# Patient Record
Sex: MALE | Race: WHITE | NOT HISPANIC OR LATINO | Employment: OTHER | URBAN - NONMETROPOLITAN AREA
[De-identification: names, ages, dates, MRNs, and addresses within clinical notes are randomized per-mention and may not be internally consistent; named-entity substitution may affect disease eponyms.]

---

## 2023-11-22 DIAGNOSIS — I10 ESSENTIAL HYPERTENSION: ICD-10-CM

## 2023-11-22 PROBLEM — R06.89 DIFFICULTY BREATHING: Status: ACTIVE | Noted: 2023-11-22

## 2023-11-22 PROBLEM — R94.31 ABNORMAL ELECTROCARDIOGRAM: Status: ACTIVE | Noted: 2023-11-22

## 2023-11-22 PROBLEM — I25.10 SINGLE VESSEL CORONARY DISEASE: Status: ACTIVE | Noted: 2023-11-22

## 2023-11-22 RX ORDER — SERTRALINE HYDROCHLORIDE 100 MG/1
1 TABLET, FILM COATED ORAL DAILY
COMMUNITY
Start: 2021-12-09

## 2023-11-22 RX ORDER — LISINOPRIL 10 MG/1
1 TABLET ORAL DAILY
COMMUNITY
Start: 2021-12-09 | End: 2023-11-22 | Stop reason: SDUPTHER

## 2023-11-22 RX ORDER — NAPROXEN 500 MG/1
1 TABLET ORAL 2 TIMES DAILY PRN
COMMUNITY
Start: 2022-02-10 | End: 2024-01-31 | Stop reason: ALTCHOICE

## 2023-11-22 RX ORDER — DICLOFENAC SODIUM 75 MG/1
1 TABLET, DELAYED RELEASE ORAL 2 TIMES DAILY PRN
COMMUNITY
Start: 2021-02-08

## 2023-11-22 RX ORDER — BUSPIRONE HYDROCHLORIDE 5 MG/1
1 TABLET ORAL 2 TIMES DAILY
COMMUNITY

## 2023-11-22 RX ORDER — METOPROLOL TARTRATE 100 MG/1
1 TABLET ORAL 2 TIMES DAILY
COMMUNITY
Start: 2020-12-03 | End: 2023-11-22 | Stop reason: SDUPTHER

## 2023-11-22 RX ORDER — NITROGLYCERIN 0.3 MG/1
0.3 TABLET SUBLINGUAL EVERY 5 MIN PRN
COMMUNITY
Start: 2021-06-29 | End: 2024-01-31 | Stop reason: DRUGHIGH

## 2023-11-22 RX ORDER — ATORVASTATIN CALCIUM 10 MG/1
1 TABLET, FILM COATED ORAL NIGHTLY
COMMUNITY
Start: 2020-12-03 | End: 2024-01-31 | Stop reason: SDUPTHER

## 2023-11-22 RX ORDER — LISINOPRIL 10 MG/1
10 TABLET ORAL DAILY
Qty: 30 TABLET | Refills: 3 | Status: SHIPPED | OUTPATIENT
Start: 2023-11-22 | End: 2024-01-31 | Stop reason: DRUGHIGH

## 2023-11-22 RX ORDER — AMLODIPINE BESYLATE 10 MG/1
10 TABLET ORAL DAILY
Qty: 90 TABLET | Refills: 3 | Status: SHIPPED | OUTPATIENT
Start: 2023-11-22 | End: 2024-01-31 | Stop reason: SDUPTHER

## 2023-11-22 RX ORDER — MOMETASONE FUROATE AND FORMOTEROL FUMARATE DIHYDRATE 100; 5 UG/1; UG/1
2 AEROSOL RESPIRATORY (INHALATION)
COMMUNITY
Start: 2021-12-02

## 2023-11-22 RX ORDER — METOPROLOL TARTRATE 100 MG/1
100 TABLET ORAL 2 TIMES DAILY
Qty: 180 TABLET | Refills: 3 | Status: SHIPPED | OUTPATIENT
Start: 2023-11-22 | End: 2024-01-31 | Stop reason: SDUPTHER

## 2023-11-22 RX ORDER — ALBUTEROL SULFATE 90 UG/1
2 AEROSOL, METERED RESPIRATORY (INHALATION) EVERY 4 HOURS PRN
COMMUNITY
Start: 2022-01-08

## 2023-11-22 RX ORDER — AMLODIPINE BESYLATE 10 MG/1
1 TABLET ORAL DAILY
COMMUNITY
Start: 2022-02-22 | End: 2023-11-22 | Stop reason: SDUPTHER

## 2024-01-30 ENCOUNTER — APPOINTMENT (OUTPATIENT)
Dept: CARDIOLOGY | Facility: CLINIC | Age: 64
End: 2024-01-30
Payer: MEDICARE

## 2024-01-31 ENCOUNTER — OFFICE VISIT (OUTPATIENT)
Dept: CARDIOLOGY | Facility: CLINIC | Age: 64
End: 2024-01-31
Payer: MEDICARE

## 2024-01-31 VITALS
SYSTOLIC BLOOD PRESSURE: 168 MMHG | DIASTOLIC BLOOD PRESSURE: 98 MMHG | HEIGHT: 70 IN | WEIGHT: 194 LBS | HEART RATE: 82 BPM | BODY MASS INDEX: 27.77 KG/M2

## 2024-01-31 DIAGNOSIS — E78.5 HYPERLIPIDEMIA, UNSPECIFIED HYPERLIPIDEMIA TYPE: ICD-10-CM

## 2024-01-31 DIAGNOSIS — R06.89 DIFFICULTY BREATHING: Primary | ICD-10-CM

## 2024-01-31 DIAGNOSIS — I25.10 SINGLE VESSEL CORONARY DISEASE: ICD-10-CM

## 2024-01-31 DIAGNOSIS — I10 ESSENTIAL HYPERTENSION: ICD-10-CM

## 2024-01-31 DIAGNOSIS — R94.31 ABNORMAL ELECTROCARDIOGRAM: ICD-10-CM

## 2024-01-31 PROCEDURE — 3080F DIAST BP >= 90 MM HG: CPT | Performed by: INTERNAL MEDICINE

## 2024-01-31 PROCEDURE — 1159F MED LIST DOCD IN RCRD: CPT | Performed by: INTERNAL MEDICINE

## 2024-01-31 PROCEDURE — 4004F PT TOBACCO SCREEN RCVD TLK: CPT | Performed by: INTERNAL MEDICINE

## 2024-01-31 PROCEDURE — 1160F RVW MEDS BY RX/DR IN RCRD: CPT | Performed by: INTERNAL MEDICINE

## 2024-01-31 PROCEDURE — 3077F SYST BP >= 140 MM HG: CPT | Performed by: INTERNAL MEDICINE

## 2024-01-31 PROCEDURE — 99214 OFFICE O/P EST MOD 30 MIN: CPT | Performed by: INTERNAL MEDICINE

## 2024-01-31 RX ORDER — METOPROLOL TARTRATE 100 MG/1
100 TABLET ORAL 2 TIMES DAILY
Qty: 180 TABLET | Refills: 3 | Status: SHIPPED | OUTPATIENT
Start: 2024-01-31 | End: 2024-04-12 | Stop reason: SDUPTHER

## 2024-01-31 RX ORDER — AMLODIPINE BESYLATE 10 MG/1
10 TABLET ORAL DAILY
Qty: 90 TABLET | Refills: 3 | Status: SHIPPED | OUTPATIENT
Start: 2024-01-31 | End: 2024-04-12 | Stop reason: SDUPTHER

## 2024-01-31 RX ORDER — NEBULIZER AND COMPRESSOR
EACH MISCELLANEOUS
Qty: 1 EACH | Refills: 0 | Status: SHIPPED | OUTPATIENT
Start: 2024-01-31

## 2024-01-31 RX ORDER — LISINOPRIL 20 MG/1
20 TABLET ORAL DAILY
Qty: 30 TABLET | Refills: 11 | Status: SHIPPED | OUTPATIENT
Start: 2024-01-31 | End: 2024-03-04

## 2024-01-31 RX ORDER — NITROGLYCERIN 0.4 MG/1
0.4 TABLET SUBLINGUAL EVERY 5 MIN PRN
COMMUNITY
End: 2024-01-31 | Stop reason: SDUPTHER

## 2024-01-31 RX ORDER — NITROGLYCERIN 0.4 MG/1
0.4 TABLET SUBLINGUAL EVERY 5 MIN PRN
Qty: 25 TABLET | Refills: 11 | Status: SHIPPED | OUTPATIENT
Start: 2024-01-31 | End: 2024-04-12 | Stop reason: SDUPTHER

## 2024-01-31 RX ORDER — ATORVASTATIN CALCIUM 10 MG/1
10 TABLET, FILM COATED ORAL NIGHTLY
Qty: 90 TABLET | Refills: 3 | Status: SHIPPED | OUTPATIENT
Start: 2024-01-31 | End: 2024-04-12 | Stop reason: SDUPTHER

## 2024-01-31 NOTE — PROGRESS NOTES
"Subjective   Estrada Hawthorne is a 63 y.o. male       Chief Complaint    Annual Exam          HPI   Patient is here for follow-up continue management for previous evaluation for shortness of breath.  Unfortunately his insurance has denied stress test.  Since the last time I saw him he reports he is feeling well.  He denies complaint of chest pain, palpitation, lightheadedness, dizziness or syncope.  He reported functional class I.  His blood pressure remains elevated.    Assessment     1. Shortness of breath probably due to prior tobacco use, and uncontrolled hypertension and sedentary lifestyle. Insurance declined stress test.  He remains reasonably active.  Currently functional class I  2. Hypertension not well controlled.   3.  Mildly overweight  4. Moderate coronary artery disease based on cardiac catheterization remotely affecting the ostium of nondominant right coronary artery based on cardiac catheterization 2002   5. Borderline abnormal EKG with incomplete right bundle branch block  6.  Previous history of depression but no suicidal ideation under control control according to him     Plan     1. I advised the patient to increase his lisinopril to 20 mg daily and to have blood pressure check and BMP  2.  Patient admits to noncompliance with salt restriction.  I counseled him at length regarding salt restriction and nonpharmacologic approach for hypertension  3. I emphasized the importance of compliance with his medication  4. Patient was counseled regarding adjustment of coronary risk factors, weight loss and exercise  5. Follow-up in 6 month  6. He was advised to discuss with his PCP his depression     Review of Systems   All other systems reviewed and are negative.         Visit Vitals  BP (!) 168/98 (BP Location: Left arm, Patient Position: Sitting)   Pulse 82   Ht 1.778 m (5' 10\")   Wt 88 kg (194 lb)   BMI 27.84 kg/m²   Smoking Status Some Days   BSA 2.08 m²        Objective   Physical Exam  Constitutional:  "      Appearance: Normal appearance. He is normal weight.   HENT:      Nose: Nose normal.   Neck:      Vascular: No carotid bruit.   Cardiovascular:      Rate and Rhythm: Normal rate.      Pulses: Normal pulses.      Heart sounds: Normal heart sounds.   Pulmonary:      Effort: Pulmonary effort is normal.   Abdominal:      General: Bowel sounds are normal.      Palpations: Abdomen is soft.   Genitourinary:     Rectum: Normal.   Musculoskeletal:         General: Normal range of motion.      Cervical back: Normal range of motion.      Right lower leg: No edema.      Left lower leg: No edema.   Skin:     General: Skin is warm and dry.   Neurological:      General: No focal deficit present.      Mental Status: He is alert.   Psychiatric:         Mood and Affect: Mood normal.         Behavior: Behavior normal.         Thought Content: Thought content normal.         Judgment: Judgment normal.         Current Medications    Current Outpatient Medications:     albuterol (ProAir HFA) 90 mcg/actuation inhaler, Inhale 2 puffs every 4 hours if needed., Disp: , Rfl:     busPIRone (Buspar) 5 mg tablet, Take 1 tablet (5 mg) by mouth 2 times a day., Disp: , Rfl:     diclofenac (Voltaren) 75 mg EC tablet, Take 1 tablet (75 mg) by mouth 2 times a day as needed., Disp: , Rfl:     mometasone-formoterol (Dulera) 100-5 mcg/actuation inhaler, Inhale 2 puffs 2 times a day., Disp: , Rfl:     sertraline (Zoloft) 100 mg tablet, Take 1 tablet (100 mg) by mouth once daily., Disp: , Rfl:     amLODIPine (Norvasc) 10 mg tablet, Take 1 tablet (10 mg) by mouth once daily., Disp: 90 tablet, Rfl: 3    atorvastatin (Lipitor) 10 mg tablet, Take 1 tablet (10 mg) by mouth once daily at bedtime., Disp: 90 tablet, Rfl: 3    lisinopril 20 mg tablet, Take 1 tablet (20 mg) by mouth once daily., Disp: 30 tablet, Rfl: 11    metoprolol tartrate (Lopressor) 100 mg tablet, Take 1 tablet (100 mg) by mouth 2 times a day., Disp: 180 tablet, Rfl: 3    miscellaneous  medical supply (Blood Pressure Cuff) misc, B/p cuff for htn, Disp: 1 each, Rfl: 0    nitroglycerin (Nitrostat) 0.4 mg SL tablet, Place 1 tablet (0.4 mg) under the tongue every 5 minutes if needed for chest pain., Disp: 25 tablet, Rfl: 11                     Assessment/Plan   1. Difficulty breathing  Basic Metabolic Panel      2. Single vessel coronary disease  Basic Metabolic Panel    Glucose, fasting    Follow Up In Cardiology    nitroglycerin (Nitrostat) 0.4 mg SL tablet    atorvastatin (Lipitor) 10 mg tablet      3. Abnormal electrocardiogram        4. Essential hypertension  Follow Up In Cardiology    Basic Metabolic Panel    lisinopril 20 mg tablet    Glucose, fasting    Follow Up In Cardiology    miscellaneous medical supply (Blood Pressure Cuff) misc    amLODIPine (Norvasc) 10 mg tablet    metoprolol tartrate (Lopressor) 100 mg tablet      5. Hyperlipidemia, unspecified hyperlipidemia type  atorvastatin (Lipitor) 10 mg tablet         Scribe Attestation  By signing my name below, I, [email or web address] , Scribtigist   attest that this documentation has been prepared under the direction and in the presence of Messi Sr MD.

## 2024-01-31 NOTE — PATIENT INSTRUCTIONS
Please bring all medicines, vitamins, and herbal supplements with you when you come to the office.    Prescriptions will not be filled unless you are compliant with your follow up appointments or have a follow up appointment scheduled as per instruction of your physician. Refills should be requested at the time of your visit.     Fall Prevention Education Given

## 2024-01-31 NOTE — LETTER
January 31, 2024     AMANDA Foss  [address] [place] [id number]    Patient: Estrada Hawthorne   YOB: 1960   Date of Visit: 1/31/2024       Dear AMANDA Rodriguez:    Thank you for referring Estrada Hawthorne to me for evaluation. Below are my notes for this consultation.  If you have questions, please do not hesitate to call me. I look forward to following your patient along with you.       Sincerely,     Messi rS MD      CC: No Recipients  ______________________________________________________________________________________    Subjective   Estrada Hawthorne is a 63 y.o. male       Chief Complaint    Annual Exam          HPI   Patient is here for follow-up continue management for previous evaluation for shortness of breath.  Unfortunately his insurance has denied stress test.  Since the last time I saw him he reports he is feeling well.  He denies complaint of chest pain, palpitation, lightheadedness, dizziness or syncope.  He reported functional class I.  His blood pressure remains elevated.    Assessment     1. Shortness of breath probably due to prior tobacco use, and uncontrolled hypertension and sedentary lifestyle. Insurance declined stress test.  He remains reasonably active.  Currently functional class I  2. Hypertension not well controlled.   3.  Mildly overweight  4. Moderate coronary artery disease based on cardiac catheterization remotely affecting the ostium of nondominant right coronary artery based on cardiac catheterization 2002   5. Borderline abnormal EKG with incomplete right bundle branch block  6.  Previous history of depression but no suicidal ideation under control control according to him     Plan     1. I advised the patient to increase his lisinopril to 20 mg daily and to have blood pressure check and BMP  2.  Patient admits to noncompliance with salt restriction.  I counseled him at length regarding salt restriction and nonpharmacologic  "approach for hypertension  3. I emphasized the importance of compliance with his medication  4. Patient was counseled regarding adjustment of coronary risk factors, weight loss and exercise  5. Follow-up in 6 month  6. He was advised to discuss with his PCP his depression     Review of Systems   All other systems reviewed and are negative.         Visit Vitals  BP (!) 168/98 (BP Location: Left arm, Patient Position: Sitting)   Pulse 82   Ht 1.778 m (5' 10\")   Wt 88 kg (194 lb)   BMI 27.84 kg/m²   Smoking Status Some Days   BSA 2.08 m²        Objective   Physical Exam  Constitutional:       Appearance: Normal appearance. He is normal weight.   HENT:      Nose: Nose normal.   Neck:      Vascular: No carotid bruit.   Cardiovascular:      Rate and Rhythm: Normal rate.      Pulses: Normal pulses.      Heart sounds: Normal heart sounds.   Pulmonary:      Effort: Pulmonary effort is normal.   Abdominal:      General: Bowel sounds are normal.      Palpations: Abdomen is soft.   Genitourinary:     Rectum: Normal.   Musculoskeletal:         General: Normal range of motion.      Cervical back: Normal range of motion.      Right lower leg: No edema.      Left lower leg: No edema.   Skin:     General: Skin is warm and dry.   Neurological:      General: No focal deficit present.      Mental Status: He is alert.   Psychiatric:         Mood and Affect: Mood normal.         Behavior: Behavior normal.         Thought Content: Thought content normal.         Judgment: Judgment normal.         Current Medications    Current Outpatient Medications:   •  albuterol (ProAir HFA) 90 mcg/actuation inhaler, Inhale 2 puffs every 4 hours if needed., Disp: , Rfl:   •  busPIRone (Buspar) 5 mg tablet, Take 1 tablet (5 mg) by mouth 2 times a day., Disp: , Rfl:   •  diclofenac (Voltaren) 75 mg EC tablet, Take 1 tablet (75 mg) by mouth 2 times a day as needed., Disp: , Rfl:   •  mometasone-formoterol (Dulera) 100-5 mcg/actuation inhaler, Inhale 2 " puffs 2 times a day., Disp: , Rfl:   •  sertraline (Zoloft) 100 mg tablet, Take 1 tablet (100 mg) by mouth once daily., Disp: , Rfl:   •  amLODIPine (Norvasc) 10 mg tablet, Take 1 tablet (10 mg) by mouth once daily., Disp: 90 tablet, Rfl: 3  •  atorvastatin (Lipitor) 10 mg tablet, Take 1 tablet (10 mg) by mouth once daily at bedtime., Disp: 90 tablet, Rfl: 3  •  lisinopril 20 mg tablet, Take 1 tablet (20 mg) by mouth once daily., Disp: 30 tablet, Rfl: 11  •  metoprolol tartrate (Lopressor) 100 mg tablet, Take 1 tablet (100 mg) by mouth 2 times a day., Disp: 180 tablet, Rfl: 3  •  miscellaneous medical supply (Blood Pressure Cuff) misc, B/p cuff for htn, Disp: 1 each, Rfl: 0  •  nitroglycerin (Nitrostat) 0.4 mg SL tablet, Place 1 tablet (0.4 mg) under the tongue every 5 minutes if needed for chest pain., Disp: 25 tablet, Rfl: 11                     Assessment/Plan   1. Difficulty breathing  Basic Metabolic Panel      2. Single vessel coronary disease  Basic Metabolic Panel    Glucose, fasting    Follow Up In Cardiology    nitroglycerin (Nitrostat) 0.4 mg SL tablet    atorvastatin (Lipitor) 10 mg tablet      3. Abnormal electrocardiogram        4. Essential hypertension  Follow Up In Cardiology    Basic Metabolic Panel    lisinopril 20 mg tablet    Glucose, fasting    Follow Up In Cardiology    miscellaneous medical supply (Blood Pressure Cuff) misc    amLODIPine (Norvasc) 10 mg tablet    metoprolol tartrate (Lopressor) 100 mg tablet      5. Hyperlipidemia, unspecified hyperlipidemia type  atorvastatin (Lipitor) 10 mg tablet         Scribe Attestation  By signing my name below, I, [email or web address] , Scribe   attest that this documentation has been prepared under the direction and in the presence of eMssi Sr MD.

## 2024-03-03 DIAGNOSIS — I10 ESSENTIAL HYPERTENSION: ICD-10-CM

## 2024-03-04 RX ORDER — LISINOPRIL 20 MG/1
20 TABLET ORAL DAILY
Qty: 90 TABLET | Refills: 3 | Status: SHIPPED | OUTPATIENT
Start: 2024-03-04 | End: 2024-04-12 | Stop reason: SDUPTHER

## 2024-03-13 ENCOUNTER — OFFICE VISIT (OUTPATIENT)
Dept: CARDIOLOGY | Facility: CLINIC | Age: 64
End: 2024-03-13
Payer: MEDICARE

## 2024-03-13 VITALS
SYSTOLIC BLOOD PRESSURE: 150 MMHG | WEIGHT: 195 LBS | HEART RATE: 74 BPM | DIASTOLIC BLOOD PRESSURE: 80 MMHG | BODY MASS INDEX: 27.92 KG/M2 | HEIGHT: 70 IN

## 2024-03-13 DIAGNOSIS — R06.89 DIFFICULTY BREATHING: ICD-10-CM

## 2024-03-13 DIAGNOSIS — I10 ESSENTIAL HYPERTENSION: ICD-10-CM

## 2024-03-13 DIAGNOSIS — I82.461 ACUTE DEEP VEIN THROMBOSIS (DVT) OF CALF MUSCLE VEIN OF RIGHT LOWER EXTREMITY (MULTI): Primary | ICD-10-CM

## 2024-03-13 DIAGNOSIS — F17.210 CIGARETTE SMOKER: ICD-10-CM

## 2024-03-13 PROCEDURE — 1160F RVW MEDS BY RX/DR IN RCRD: CPT | Performed by: INTERNAL MEDICINE

## 2024-03-13 PROCEDURE — 3079F DIAST BP 80-89 MM HG: CPT | Performed by: INTERNAL MEDICINE

## 2024-03-13 PROCEDURE — 99212 OFFICE O/P EST SF 10 MIN: CPT | Performed by: INTERNAL MEDICINE

## 2024-03-13 PROCEDURE — 1159F MED LIST DOCD IN RCRD: CPT | Performed by: INTERNAL MEDICINE

## 2024-03-13 PROCEDURE — 4004F PT TOBACCO SCREEN RCVD TLK: CPT | Performed by: INTERNAL MEDICINE

## 2024-03-13 PROCEDURE — 3008F BODY MASS INDEX DOCD: CPT | Performed by: INTERNAL MEDICINE

## 2024-03-13 PROCEDURE — 3077F SYST BP >= 140 MM HG: CPT | Performed by: INTERNAL MEDICINE

## 2024-03-13 RX ORDER — HYDROCHLOROTHIAZIDE 25 MG/1
25 TABLET ORAL DAILY
Qty: 90 TABLET | Refills: 3 | Status: SHIPPED | OUTPATIENT
Start: 2024-03-13 | End: 2024-04-12 | Stop reason: SDUPTHER

## 2024-03-13 ASSESSMENT — ENCOUNTER SYMPTOMS: SHORTNESS OF BREATH: 1

## 2024-03-13 NOTE — PROGRESS NOTES
"Subjective   Estrada Hawthorne is a 63 y.o. male       Chief Complaint    Blood Pressure Check          HPI   Patient is here for for follow-up and continue management for hypertension.  We increased his lisinopril recently.  His blood pressure remains elevated.  In addition he reports that he was in the emergency room recently and was diagnosed with distal lower extremity thrombophlebitis.  Apparently a prescription for Eliquis was sent but apparently the patient did not find one in the pharmacy.  He requesting an assistant with that.    Assessment    1.  Hypertension remain not well-controlled  2.  Recent diagnosis of distal thrombophlebitis of the right lower extremity    Plan    1.  Will add hydrochlorothiazide 25 mg daily and do a blood pressure check and a BMP in 6 weeks  2.  Will give him a prescription for Eliquis 5 mg twice daily to be used for 3 to 6-month and we will reevaluate down the road.  Risk, benefits alternative anticoagulation reviewed with patient at length he understood and agreed  Review of Systems   Respiratory:  Positive for shortness of breath.             Vitals:    03/13/24 1348 03/13/24 1352 03/13/24 1400   BP: 172/90 160/84 150/80   BP Location: Left arm Right arm Right arm   Patient Position: Sitting Sitting Sitting   Pulse: 74     Weight: 88.5 kg (195 lb)     Height: 1.778 m (5' 10\")          Objective   Physical Exam    Allergies  Patient has no known allergies.     Current Medications    Current Outpatient Medications:     albuterol (ProAir HFA) 90 mcg/actuation inhaler, Inhale 2 puffs every 4 hours if needed., Disp: , Rfl:     amLODIPine (Norvasc) 10 mg tablet, Take 1 tablet (10 mg) by mouth once daily., Disp: 90 tablet, Rfl: 3    atorvastatin (Lipitor) 10 mg tablet, Take 1 tablet (10 mg) by mouth once daily at bedtime., Disp: 90 tablet, Rfl: 3    busPIRone (Buspar) 5 mg tablet, Take 1 tablet (5 mg) by mouth 2 times a day., Disp: , Rfl:     diclofenac (Voltaren) 75 mg EC tablet, Take 1 " tablet (75 mg) by mouth 2 times a day as needed., Disp: , Rfl:     lisinopril 20 mg tablet, take 1 tablet by mouth once daily, Disp: 90 tablet, Rfl: 3    metoprolol tartrate (Lopressor) 100 mg tablet, Take 1 tablet (100 mg) by mouth 2 times a day., Disp: 180 tablet, Rfl: 3    mometasone-formoterol (Dulera) 100-5 mcg/actuation inhaler, Inhale 2 puffs 2 times a day., Disp: , Rfl:     nitroglycerin (Nitrostat) 0.4 mg SL tablet, Place 1 tablet (0.4 mg) under the tongue every 5 minutes if needed for chest pain., Disp: 25 tablet, Rfl: 11    sertraline (Zoloft) 100 mg tablet, Take 1 tablet (100 mg) by mouth once daily., Disp: , Rfl:     hydroCHLOROthiazide (HYDRODiuril) 25 mg tablet, Take 1 tablet (25 mg) by mouth once daily., Disp: 90 tablet, Rfl: 3    miscellaneous medical supply (Blood Pressure Cuff) misc, B/p cuff for htn, Disp: 1 each, Rfl: 0                     Assessment/Plan   1. Essential hypertension  Follow Up In Cardiology    hydroCHLOROthiazide (HYDRODiuril) 25 mg tablet    Basic Metabolic Panel    Basic Metabolic Panel      2. BMI 27.0-27.9,adult        3. Cigarette smoker                 Scribe Attestation  By signing my name below, Christine GAVIRIA LPN, Scribe   attest that this documentation has been prepared under the direction and in the presence of Messi Sr MD.     Provider Attestation - Scribe documentation    All medical record entries made by the Scribe were at my direction and personally dictated by me. I have reviewed the chart and agree that the record accurately reflects my personal performance of the history, physical exam, discussion and plan.

## 2024-03-13 NOTE — PATIENT INSTRUCTIONS
Please bring all medicines, vitamins, and herbal supplements with you when you come to the office.    Prescriptions will not be filled unless you are compliant with your follow up appointments or have a follow up appointment scheduled as per instruction of your physician. Refills should be requested at the time of your visit.    BMI was above normal measurement. Current weight: 88.5 kg (195 lb)  Weight change since last visit (-) denotes wt loss 1 lbs   Weight loss needed to achieve BMI 25: 21.1 Lbs  Weight loss needed to achieve BMI 30: -13.6 Lbs  Provided instructions on dietary changes  Provided instructions on exercise.

## 2024-04-12 DIAGNOSIS — I25.10 SINGLE VESSEL CORONARY DISEASE: ICD-10-CM

## 2024-04-12 DIAGNOSIS — E78.5 HYPERLIPIDEMIA, UNSPECIFIED HYPERLIPIDEMIA TYPE: ICD-10-CM

## 2024-04-12 DIAGNOSIS — I10 ESSENTIAL HYPERTENSION: ICD-10-CM

## 2024-04-12 RX ORDER — NITROGLYCERIN 0.4 MG/1
0.4 TABLET SUBLINGUAL EVERY 5 MIN PRN
Qty: 25 TABLET | Refills: 11 | Status: SHIPPED | OUTPATIENT
Start: 2024-04-12

## 2024-04-12 RX ORDER — LISINOPRIL 20 MG/1
20 TABLET ORAL DAILY
Qty: 90 TABLET | Refills: 3 | Status: SHIPPED | OUTPATIENT
Start: 2024-04-12

## 2024-04-12 RX ORDER — HYDROCHLOROTHIAZIDE 25 MG/1
25 TABLET ORAL DAILY
Qty: 90 TABLET | Refills: 3 | Status: SHIPPED | OUTPATIENT
Start: 2024-04-12 | End: 2025-04-12

## 2024-04-12 RX ORDER — METOPROLOL TARTRATE 100 MG/1
100 TABLET ORAL 2 TIMES DAILY
Qty: 180 TABLET | Refills: 3 | Status: SHIPPED | OUTPATIENT
Start: 2024-04-12 | End: 2025-04-12

## 2024-04-12 RX ORDER — ATORVASTATIN CALCIUM 10 MG/1
10 TABLET, FILM COATED ORAL NIGHTLY
Qty: 90 TABLET | Refills: 3 | Status: SHIPPED | OUTPATIENT
Start: 2024-04-12 | End: 2025-04-12

## 2024-04-12 RX ORDER — AMLODIPINE BESYLATE 10 MG/1
10 TABLET ORAL DAILY
Qty: 90 TABLET | Refills: 3 | Status: SHIPPED | OUTPATIENT
Start: 2024-04-12 | End: 2025-04-12

## 2024-07-31 ENCOUNTER — APPOINTMENT (OUTPATIENT)
Dept: CARDIOLOGY | Facility: CLINIC | Age: 64
End: 2024-07-31
Payer: MEDICARE

## 2024-09-26 DIAGNOSIS — R06.89 DIFFICULTY BREATHING: ICD-10-CM

## 2025-01-28 ENCOUNTER — TELEPHONE (OUTPATIENT)
Dept: CARDIOLOGY | Facility: CLINIC | Age: 65
End: 2025-01-28
Payer: MEDICARE

## 2025-01-28 NOTE — TELEPHONE ENCOUNTER
Patient walk-in from Dr. Cooley's office. He is to have a cholecystectomy. No date yet. Dr. Cooley is requesting cardiac clearance and to stop Eliquis. Last office visit 3/13/24. Patient was a no show on 7/31/24. Patient was sent 3 letters to make a follow up appointment.    To Dr. Messi Sr MD for review

## 2025-01-29 NOTE — TELEPHONE ENCOUNTER
Daughter phones back. Advised her that Dr. Messi Sr MD states patient is cleared from a cardiac standpoint for surgery. However, he needs to reach out to the doctor that prescribed his eliquis in order to be able to hold that. She verbalized understanding and states she will update her dad.

## 2025-01-29 NOTE — TELEPHONE ENCOUNTER
Attempt to call patient, not currently home.  Daughter advised she will have patient return call when he gets home.

## 2025-03-24 DIAGNOSIS — R06.89 DIFFICULTY BREATHING: ICD-10-CM

## 2025-03-27 RX ORDER — APIXABAN 5 MG/1
TABLET, FILM COATED ORAL
Qty: 180 TABLET | Refills: 3 | Status: SHIPPED | OUTPATIENT
Start: 2025-03-27 | End: 2026-03-27

## 2025-03-28 DIAGNOSIS — I25.10 SINGLE VESSEL CORONARY DISEASE: ICD-10-CM

## 2025-03-28 DIAGNOSIS — I10 ESSENTIAL HYPERTENSION: ICD-10-CM

## 2025-03-28 DIAGNOSIS — E78.5 HYPERLIPIDEMIA, UNSPECIFIED HYPERLIPIDEMIA TYPE: ICD-10-CM

## 2025-03-31 RX ORDER — LISINOPRIL 20 MG/1
TABLET ORAL
Qty: 90 TABLET | Refills: 11 | Status: SHIPPED | OUTPATIENT
Start: 2025-03-31

## 2025-03-31 RX ORDER — METOPROLOL TARTRATE 100 MG/1
TABLET ORAL
Qty: 180 TABLET | Refills: 11 | Status: SHIPPED | OUTPATIENT
Start: 2025-03-31

## 2025-03-31 RX ORDER — HYDROCHLOROTHIAZIDE 25 MG/1
TABLET ORAL
Qty: 90 TABLET | Refills: 11 | Status: SHIPPED | OUTPATIENT
Start: 2025-03-31

## 2025-03-31 RX ORDER — ATORVASTATIN CALCIUM 10 MG/1
TABLET, FILM COATED ORAL
Qty: 90 TABLET | Refills: 11 | Status: SHIPPED | OUTPATIENT
Start: 2025-03-31

## 2025-03-31 RX ORDER — AMLODIPINE BESYLATE 10 MG/1
TABLET ORAL
Qty: 90 TABLET | Refills: 11 | Status: SHIPPED | OUTPATIENT
Start: 2025-03-31